# Patient Record
(demographics unavailable — no encounter records)

---

## 2018-12-31 NOTE — ST
PATIENT:BRENNON KELLY                              MEDICAL RECORD: [id number]
SEX: M                                                   LOCATION:[facility]         
ORDER #:                                                 ADMISSION DATE: 12/28/18
AGE OF PATIENT: 59            
 
REFERRING PHYSICIAN:                                                             
 
INTERPRETING PHYSICIAN: RANULFO DONNELLY MD             

 
 
DATE OF SERVICE:  12/28/2018
 
Nuclear Stress Test
 
INDICATION:  Angina, abnormal ECG, family history of coronary artery disease.
 
The patient was exercised in standard Blake protocol for 5 minutes 30 seconds,
terminated due to achievement of maximum target heart rate response with 33 mCi
of sestamibi injected at peak stress 11 mCi were used previously for rest
images.
 
FINDINGS:  Gated SPECT reveals a preserved mildly decreased ejection fraction at
48% with good wall motioning and thickening and brightening throughout all
segments.  SPECT imaging Cardiolite was used as myocardial fusion agent.  There
is definite reversibility inferiorly and laterally as well as apically.  This
includes the basal, mid apical, inferior segments, the apex itself, apical
lateral, mid lateral and basal lateral segments.  The degree of reversibility is
moderate.  The amount of myocardial involved is large.
 
OVERALL IMPRESSION:  This is an abnormal nuclear stress test, large area of
reversible ischemia, inferior apically as well as laterally suggestive of
hemodynamically significant coronary artery disease and possibly multivessel
disease.  We will proceed with coronary angiography to followup study.
 
TRANSINT:[id number] Voice Confirmation ID: [id number] DOCUMENT ID: [id number]
 
 
                                           
                                           RANULFO DONNELLY MD             
 
 
 
Electronically Signed by RANULFO DONNELLY on 12/31/18 at 1436
 
 
 
 
 
 
CC: DEBORA ABBOTT                                              [id number]
DICTATION DATE: 12/29/18 1145     :     12/30/18 0319      DEP CLI 
                                                                      12/28/18
[place] [id number] [place] [phone]